# Patient Record
Sex: MALE | Race: WHITE | NOT HISPANIC OR LATINO | Employment: UNEMPLOYED | URBAN - METROPOLITAN AREA
[De-identification: names, ages, dates, MRNs, and addresses within clinical notes are randomized per-mention and may not be internally consistent; named-entity substitution may affect disease eponyms.]

---

## 2023-01-25 ENCOUNTER — OFFICE VISIT (OUTPATIENT)
Dept: ORTHOPEDICS | Facility: CLINIC | Age: 43
End: 2023-01-25
Payer: MEDICAID

## 2023-01-25 ENCOUNTER — HOSPITAL ENCOUNTER (OUTPATIENT)
Dept: RADIOLOGY | Facility: HOSPITAL | Age: 43
Discharge: HOME | End: 2023-01-25
Attending: STUDENT IN AN ORGANIZED HEALTH CARE EDUCATION/TRAINING PROGRAM
Payer: MEDICAID

## 2023-01-25 VITALS
SYSTOLIC BLOOD PRESSURE: 138 MMHG | HEIGHT: 73 IN | TEMPERATURE: 99 F | HEART RATE: 89 BPM | DIASTOLIC BLOOD PRESSURE: 85 MMHG | BODY MASS INDEX: 34.33 KG/M2 | WEIGHT: 259 LBS | OXYGEN SATURATION: 99 % | RESPIRATION RATE: 16 BRPM

## 2023-01-25 DIAGNOSIS — S66.911A OVERUSE SYNDROME OF RIGHT HAND, INITIAL ENCOUNTER: Primary | ICD-10-CM

## 2023-01-25 DIAGNOSIS — M79.641 PAIN IN BOTH HANDS: ICD-10-CM

## 2023-01-25 DIAGNOSIS — S66.912A OVERUSE SYNDROME OF LEFT HAND, INITIAL ENCOUNTER: ICD-10-CM

## 2023-01-25 DIAGNOSIS — M79.642 PAIN IN BOTH HANDS: ICD-10-CM

## 2023-01-25 PROCEDURE — 99205 OFFICE O/P NEW HI 60 MIN: CPT | Mod: PBBFAC

## 2023-01-25 PROCEDURE — 73130 X-RAY EXAM OF HAND: CPT | Mod: TC,LT

## 2023-01-25 PROCEDURE — 73130 X-RAY EXAM OF HAND: CPT | Mod: TC,RT

## 2023-01-25 RX ORDER — NABUMETONE 500 MG/1
500 TABLET, FILM COATED ORAL 2 TIMES DAILY PRN
COMMUNITY
Start: 2023-01-09

## 2023-01-25 RX ORDER — ATOMOXETINE 60 MG/1
60 CAPSULE ORAL EVERY MORNING
COMMUNITY
Start: 2023-01-09

## 2023-01-25 RX ORDER — LEVOCETIRIZINE DIHYDROCHLORIDE 5 MG/1
5 TABLET, FILM COATED ORAL
COMMUNITY
Start: 2023-01-09

## 2023-01-25 RX ORDER — AMLODIPINE BESYLATE 10 MG/1
10 TABLET ORAL EVERY MORNING
COMMUNITY
Start: 2023-01-09

## 2023-01-25 RX ORDER — MELOXICAM 15 MG/1
15 TABLET ORAL DAILY
Qty: 7 TABLET | Refills: 0 | Status: SHIPPED | OUTPATIENT
Start: 2023-01-25

## 2023-01-25 RX ORDER — ENALAPRIL MALEATE 10 MG/1
10 TABLET ORAL 2 TIMES DAILY
COMMUNITY
Start: 2023-01-09

## 2023-01-25 RX ORDER — MONTELUKAST SODIUM 10 MG/1
10 TABLET ORAL NIGHTLY
COMMUNITY
Start: 2023-01-09

## 2023-01-25 RX ORDER — ALPRAZOLAM 0.5 MG/1
0.5 TABLET ORAL DAILY PRN
COMMUNITY
Start: 2023-01-09

## 2023-01-25 RX ORDER — BUPROPION HYDROCHLORIDE 300 MG/1
300 TABLET ORAL EVERY MORNING
COMMUNITY
Start: 2023-01-09

## 2023-01-25 RX ORDER — FLUTICASONE PROPIONATE 50 MCG
2 SPRAY, SUSPENSION (ML) NASAL
COMMUNITY
Start: 2022-11-07

## 2023-01-25 NOTE — PROGRESS NOTES
"Subjective:    Patient ID: Carlos Alberto Ann is a right handed 42 y.o. male  who presented to [facility] for new visit..      Chief Complaint: Pain of the Left Hand and Pain of the Right Hand      History of Present Illness:    Carlos Alberto Ann Planes of bilateral hand pain and swelling.  His symptoms have been present for a few years.  He denies any trauma prior to the onset of his symptoms.  He works with his hands a lot for work as a  and says that he gets significant pain and swelling in the webs  of both hands between his thumb and index fingers.  He notices that it is worse when he is using small tools.  He has not tried any medications, therapy, or braces for his symptoms.    Hand Review of Systems:  Swelling?  yes  Instability?  no  Clicking?  no  Limited ROM? no  Fever/Chills? no  Numbness/Tingling? yes  Weakness? no       Objective:      Physical Exam:    /85   Pulse 89   Temp 98.6 °F (37 °C)   Resp 16   Ht 6' 1" (1.854 m)   Wt 117.5 kg (259 lb)   SpO2 99%   BMI 34.17 kg/m²       Appearance:  Soft tissue swelling: Left: yes (Webspace between thumb and index finger)   Right: yes (Webspace between thumb and index finger)  Effusion: Left:  Negative Right: Negative  Erythema: Left no Right: no  Ecchymosis: Left: no Right: no  Atrophy: Left: no Right: no    Palpation:  Hand/wrist Tenderness: Left:  none  Right:  none    Range of motion:  Flexion (0-80): Left:  80 Right: 80  Extension (0-70): Left:  70 Right: 70  Ulnar deviation (0-30): 30 Right: 30  Radial deviation (0-20): 20 Right: 20  Supination (0-90): Left: 90 Right: 90  Pronation (0-90): Left: 90 Right: 90  Able to make a power fist and claw hand: on Both hand(s)  Distal palmar crease-finger tip distance: 0 on Both hand(s)    Strength:  Flexion: Left: 5/5 Pain: no Right: 5/5 Pain: no  Extension: Left: 5/5 Pain: no Right: 5/5 Pain: no  Supination: Left: 5/5 Pain: no Right: 5/5 Pain: no  Pronation: " Left: 5/5 Pain: no Right: 5/5 Pain: no  Ulnar deviation: Left: 5/5 Pain: no Right: 5/5 Pain: no  Radial deviation: Left: 5/5 Pain: no Right: 5/5 Pain: no    Special Tests:  Durkans Test (Carpal Compression test): Left: Negative  Right: Negative  Tinels:  Left: Negative  Right: Negative      UCL laxity: Left: Negative  Right: Negative  FDP test: Left: Negative  Right: Negative  FDS test: Left: Negative  Right: Negative  Finkelstein's Test: Left: Negative Right: Negative    Froments: Left: Negative Right: Negative  Shuck Test: Left: Negative Right: Negative    AIN/PIN/Radial nerve: Intact and symmetric    General appearance: NAD  Peripheral pulses: normal bilaterally   Sensation: normal    Imaging:   Previous images not done.  X-rays ordered and performed today: yes  # of views: 3 Laterality: bilateral  My Interpretation:  Distal Radial ulnar joint space is overall Normal on AP views. Scapholunate interval distance is Normal on bilateral AP views. A DISI/VISI is not suggested on bilateral hand series. Negative/positive ulnar variance is not suggested on lbilateral lateral views.   Soft tissue swelling noted.  No acute fractures, dislocations, or degenerative changes noted.      Limited bedside ultrasound  (Bilateral)  Mild soft tissue swelling noted in the webspace between 1st and 2nd digits.  No hyperemia noted in this area.  No free fluid or cystic structures appreciated.  Bony cortices intact without abnormalities.  No disruption of the extensor tendons.    Assessment:        Encounter Diagnoses   Code Name Primary?    S66.911A Overuse syndrome of right hand, initial encounter Yes    S66.912A Overuse syndrome of left hand, initial encounter         Plan:       Dx:   Suspect bilateral  overuse injuries to the hands  Treatment Plan: Discussed with patient diagnosis and treatment recommendations.   Natural history and expected course discussed. Questions answered.  Educational material distributed.  Reduction in  offending activity.  Gentle ROM exercises.  Rest, ice, compression, and elevation (RICE) therapy.  Using a compression brace provided to you from your local pharmacy or durable medical equipment (DME) store.  Home physical therapy exercise handouts provided to patient.   formal PT script provided to patient. You may take this script to whichever physical therapist you would like to go to.   Over the counter NSAID and/or tylenol provided you do not have contraindications such as but not limited to liver or kidney disease or uncontrolled blood pressure. If you're doctors have told you to to not take them based on your health, do not take them.     Imaging: radiological studies ordered and independently reviewed; discussed with patient; pending radiologist interpretation.   Procedure: Discussed CSI/VSI as treatment options; patient not a candidate for CSI or VS.  Activity: Activity as tolerated  Therapy: Occupational Therapy  Medication: start  meloxicam 15 mg daily; medication precautions given. Please see your primary care physician for further refills.  RTC: 4-6 weeks.